# Patient Record
Sex: FEMALE | Race: WHITE | NOT HISPANIC OR LATINO
[De-identification: names, ages, dates, MRNs, and addresses within clinical notes are randomized per-mention and may not be internally consistent; named-entity substitution may affect disease eponyms.]

---

## 2022-04-04 ENCOUNTER — RESULT REVIEW (OUTPATIENT)
Age: 22
End: 2022-04-04

## 2022-10-07 PROBLEM — Z00.00 ENCOUNTER FOR PREVENTIVE HEALTH EXAMINATION: Status: ACTIVE | Noted: 2022-10-07

## 2022-11-14 ENCOUNTER — APPOINTMENT (OUTPATIENT)
Dept: GASTROENTEROLOGY | Facility: CLINIC | Age: 22
End: 2022-11-14

## 2022-11-14 VITALS
RESPIRATION RATE: 14 BRPM | WEIGHT: 160 LBS | DIASTOLIC BLOOD PRESSURE: 72 MMHG | BODY MASS INDEX: 25.71 KG/M2 | SYSTOLIC BLOOD PRESSURE: 116 MMHG | HEART RATE: 87 BPM | TEMPERATURE: 97.2 F | HEIGHT: 66 IN

## 2022-11-14 DIAGNOSIS — Z78.9 OTHER SPECIFIED HEALTH STATUS: ICD-10-CM

## 2022-11-14 DIAGNOSIS — Z80.0 FAMILY HISTORY OF MALIGNANT NEOPLASM OF DIGESTIVE ORGANS: ICD-10-CM

## 2022-11-14 PROCEDURE — 99204 OFFICE O/P NEW MOD 45 MIN: CPT

## 2022-11-14 RX ORDER — LINACLOTIDE 145 UG/1
145 CAPSULE, GELATIN COATED ORAL
Qty: 30 | Refills: 5 | Status: ACTIVE | COMMUNITY
Start: 2022-11-14 | End: 1900-01-01

## 2022-11-14 RX ORDER — POLYETHYLENE GLYCOL 3350 AND ELECTROLYTES WITH LEMON FLAVOR 236; 22.74; 6.74; 5.86; 2.97 G/4L; G/4L; G/4L; G/4L; G/4L
236 POWDER, FOR SOLUTION ORAL
Qty: 1 | Refills: 0 | Status: ACTIVE | COMMUNITY
Start: 2022-11-14 | End: 1900-01-01

## 2022-11-14 RX ORDER — NORETHINDRONE ACETATE AND ETHINYL ESTRADIOL 1MG-20(21)
1-20 KIT ORAL
Qty: 84 | Refills: 0 | Status: ACTIVE | COMMUNITY
Start: 2022-09-12

## 2022-11-14 RX ORDER — ESCITALOPRAM OXALATE 10 MG/1
10 TABLET ORAL
Qty: 90 | Refills: 0 | Status: ACTIVE | COMMUNITY
Start: 2022-03-16

## 2022-11-14 RX ORDER — AZITHROMYCIN 250 MG/1
250 TABLET, FILM COATED ORAL
Qty: 6 | Refills: 0 | Status: ACTIVE | COMMUNITY
Start: 2022-08-16

## 2022-11-14 NOTE — PHYSICAL EXAM
[Alert] : alert [Normal Voice/Communication] : normal voice/communication [Healthy Appearing] : healthy appearing [No Acute Distress] : no acute distress [Well Developed] : well developed [Well Nourished] : well nourished [Sclera] : the sclera and conjunctiva were normal [Hearing Threshold Finger Rub Not [place]] : hearing was normal [Normal Lips/Gums] : the lips and gums were normal [Oropharynx] : the oropharynx was normal [Normal Appearance] : the appearance of the neck was normal [No Neck Mass] : no neck mass was observed [No Respiratory Distress] : no respiratory distress [No Acc Muscle Use] : no accessory muscle use [Respiration, Rhythm And Depth] : normal respiratory rhythm and effort [Auscultation Breath Sounds / Voice Sounds] : lungs were clear to auscultation bilaterally [Heart Rate And Rhythm] : heart rate was normal and rhythm regular [Normal S1, S2] : normal S1 and S2 [Murmurs] : no murmurs [None] : no edema [Bowel Sounds] : normal bowel sounds [No Masses] : no abdominal mass palpated [Abdomen Soft] : soft [] : no hepatosplenomegaly [Suprapubic] : in the suprapubic area [RLQ] : RLQ [LLQ] : LLQ [Other: ___] : [unfilled] [No CVA Tenderness] : no CVA  tenderness [Abnormal Walk] : normal gait [No Joint Swelling] : no joint swelling seen [Normal Color / Pigmentation] : normal skin color and pigmentation [Oriented To Time, Place, And Person] : oriented to person, place, and time [Epigastric] : not in the epigastric area [Periumbilical] : not in the periumbilical area [RUQ] : not in the in the right upper quadrant [LUQ] : not in the left upper quadrant [de-identified] : Deferred until colonoscopy

## 2022-11-14 NOTE — REVIEW OF SYSTEMS
[As Noted in HPI] : as noted in HPI [Constipation] : constipation [Negative] : Heme/Lymph [Abdominal Pain] : abdominal pain [Vomiting] : no vomiting [Diarrhea] : no diarrhea [Heartburn] : no heartburn [Melena (black stool)] : no melena [Bleeding] : no bleeding [Fecal Incontinence (soiling)] : no fecal incontinence [Bloating (gassiness)] : no bloating

## 2022-11-14 NOTE — ASSESSMENT
[FreeTextEntry1] : Impression: Bilateral lower abdominal pain with slow transit constipation rule out possible colonic neoplasm and/or structural colonic abnormality.  Rule out possible constipation predominant IBS.\par \par Recommendations: Linzess 145 mcg once daily was prescribed for initial treatment of her chronic constipation pending diagnostic colonoscopy which was advised for further evaluation of the above.  The risk versus benefits of colonoscopy and intravenous sedation, and alternative testing such as virtual colonoscopy, were individually explained to the patient today who appeared to understand all of the above and was agreeable to proceeding with diagnostic colonoscopy.  Her ASA classification is 1 and she is medically optimized for the proposed colonoscopy and will be prepped with generic GoLytely.  She appeared to understand all of the above instructions, information, and management plan.

## 2022-11-14 NOTE — REASON FOR VISIT
[Initial Evaluation] : an initial evaluation [FreeTextEntry1] : For chronic slow transit constipation

## 2023-01-05 ENCOUNTER — APPOINTMENT (OUTPATIENT)
Dept: GASTROENTEROLOGY | Facility: GI CENTER | Age: 23
End: 2023-01-05

## 2023-05-29 ENCOUNTER — TRANSCRIPTION ENCOUNTER (OUTPATIENT)
Age: 23
End: 2023-05-29

## 2023-05-30 ENCOUNTER — OUTPATIENT (OUTPATIENT)
Dept: OUTPATIENT SERVICES | Facility: HOSPITAL | Age: 23
LOS: 1 days | End: 2023-05-30
Payer: COMMERCIAL

## 2023-05-30 ENCOUNTER — APPOINTMENT (OUTPATIENT)
Dept: GASTROENTEROLOGY | Facility: GI CENTER | Age: 23
End: 2023-05-30
Payer: COMMERCIAL

## 2023-05-30 DIAGNOSIS — K59.01 SLOW TRANSIT CONSTIPATION: ICD-10-CM

## 2023-05-30 DIAGNOSIS — K64.8 OTHER HEMORRHOIDS: ICD-10-CM

## 2023-05-30 DIAGNOSIS — R10.30 LOWER ABDOMINAL PAIN, UNSPECIFIED: ICD-10-CM

## 2023-05-30 PROCEDURE — 45378 DIAGNOSTIC COLONOSCOPY: CPT

## 2023-05-30 NOTE — REVIEW OF SYSTEMS
[As Noted in HPI] : as noted in HPI [Abdominal Pain] : abdominal pain [Constipation] : constipation [Negative] : Heme/Lymph [Vomiting] : no vomiting [Diarrhea] : no diarrhea [Heartburn] : no heartburn [Melena (black stool)] : no melena [Fecal Incontinence (soiling)] : no fecal incontinence [Swollen Glands] : no swollen glands

## 2023-05-30 NOTE — ASSESSMENT
[FreeTextEntry1] : Impression: Bilateral lower abdominal pain with slow transit constipation rule out possible colonic neoplasm and/or structural colonic abnormality.  Rule out possible constipation predominant IBS.\par \par Recommendations: Linzess 145 mcg once daily was prescribed for initial treatment of her chronic constipation pending diagnostic colonoscopy which was advised for further evaluation of the above.  The risk versus benefits of colonoscopy and intravenous sedation, and alternative testing such as virtual colonoscopy, were individually explained to the patient today who appeared to understand all of the above and was agreeable to proceeding with diagnostic colonoscopy.  Her ASA classification is 1 and she is medically optimized for the proposed colonoscopy and will be prepped with generic GoLytely.  She appeared to understand all of the above instructions, information, and management plan.\par \par She remains medically optimized.

## 2023-05-30 NOTE — REASON FOR VISIT
[Procedure: _________] : a [unfilled] procedure visit [FreeTextEntry1] : Lower abdominal pain and constipation

## 2023-05-30 NOTE — PHYSICAL EXAM
[Alert] : alert [Normal Voice/Communication] : normal voice/communication [Healthy Appearing] : healthy appearing [No Acute Distress] : no acute distress [Well Developed] : well developed [Well Nourished] : well nourished [Sclera] : the sclera and conjunctiva were normal [Hearing Threshold Finger Rub Not [place]] : hearing was normal [Normal Lips/Gums] : the lips and gums were normal [Oropharynx] : the oropharynx was normal [Normal Appearance] : the appearance of the neck was normal [No Neck Mass] : no neck mass was observed [No Respiratory Distress] : no respiratory distress [No Acc Muscle Use] : no accessory muscle use [Respiration, Rhythm And Depth] : normal respiratory rhythm and effort [Auscultation Breath Sounds / Voice Sounds] : lungs were clear to auscultation bilaterally [Heart Rate And Rhythm] : heart rate was normal and rhythm regular [Normal S1, S2] : normal S1 and S2 [Murmurs] : no murmurs [None] : no edema [Bowel Sounds] : normal bowel sounds [No Masses] : no abdominal mass palpated [Abdomen Soft] : soft [] : no hepatosplenomegaly [Suprapubic] : in the suprapubic area [RLQ] : RLQ [LLQ] : LLQ [Other: ___] : [unfilled] [No CVA Tenderness] : no CVA  tenderness [Abnormal Walk] : normal gait [No Joint Swelling] : no joint swelling seen [Normal Color / Pigmentation] : normal skin color and pigmentation [Oriented To Time, Place, And Person] : oriented to person, place, and time [Epigastric] : not in the epigastric area [Periumbilical] : not in the periumbilical area [RUQ] : not in the in the right upper quadrant [LUQ] : not in the left upper quadrant [de-identified] : Deferred until colonoscopy

## 2023-06-01 PROCEDURE — 45378 DIAGNOSTIC COLONOSCOPY: CPT

## (undated) DEVICE — STERIS DEFENDO 3-PIECE KIT (AIR/WATER, SUCTION & BIOPSY VALVES)